# Patient Record
Sex: MALE | Race: WHITE | URBAN - METROPOLITAN AREA
[De-identification: names, ages, dates, MRNs, and addresses within clinical notes are randomized per-mention and may not be internally consistent; named-entity substitution may affect disease eponyms.]

---

## 2019-02-13 ENCOUNTER — APPOINTMENT (RX ONLY)
Dept: URBAN - METROPOLITAN AREA CLINIC 306 | Facility: CLINIC | Age: 79
Setting detail: DERMATOLOGY
End: 2019-02-13

## 2019-02-13 VITALS — WEIGHT: 200 LBS | HEIGHT: 69 IN

## 2019-02-13 DIAGNOSIS — L20.89 OTHER ATOPIC DERMATITIS: ICD-10-CM

## 2019-02-13 DIAGNOSIS — L57.0 ACTINIC KERATOSIS: ICD-10-CM

## 2019-02-13 DIAGNOSIS — Z85.820 PERSONAL HISTORY OF MALIGNANT MELANOMA OF SKIN: ICD-10-CM

## 2019-02-13 PROBLEM — L20.84 INTRINSIC (ALLERGIC) ECZEMA: Status: ACTIVE | Noted: 2019-02-13

## 2019-02-13 PROBLEM — D48.5 NEOPLASM OF UNCERTAIN BEHAVIOR OF SKIN: Status: ACTIVE | Noted: 2019-02-13

## 2019-02-13 PROBLEM — Z85.828 PERSONAL HISTORY OF OTHER MALIGNANT NEOPLASM OF SKIN: Status: ACTIVE | Noted: 2019-02-13

## 2019-02-13 PROBLEM — I10 ESSENTIAL (PRIMARY) HYPERTENSION: Status: ACTIVE | Noted: 2019-02-13

## 2019-02-13 PROBLEM — E13.9 OTHER SPECIFIED DIABETES MELLITUS WITHOUT COMPLICATIONS: Status: ACTIVE | Noted: 2019-02-13

## 2019-02-13 PROBLEM — M12.9 ARTHROPATHY, UNSPECIFIED: Status: ACTIVE | Noted: 2019-02-13

## 2019-02-13 PROBLEM — E78.5 HYPERLIPIDEMIA, UNSPECIFIED: Status: ACTIVE | Noted: 2019-02-13

## 2019-02-13 PROBLEM — H91.90 UNSPECIFIED HEARING LOSS, UNSPECIFIED EAR: Status: ACTIVE | Noted: 2019-02-13

## 2019-02-13 PROBLEM — J45.909 UNSPECIFIED ASTHMA, UNCOMPLICATED: Status: ACTIVE | Noted: 2019-02-13

## 2019-02-13 PROBLEM — L23.7 ALLERGIC CONTACT DERMATITIS DUE TO PLANTS, EXCEPT FOOD: Status: ACTIVE | Noted: 2019-02-13

## 2019-02-13 PROCEDURE — ? PRESCRIPTION

## 2019-02-13 PROCEDURE — 11102 TANGNTL BX SKIN SINGLE LES: CPT

## 2019-02-13 PROCEDURE — ? ADDITIONAL NOTES

## 2019-02-13 PROCEDURE — 99202 OFFICE O/P NEW SF 15 MIN: CPT | Mod: 25

## 2019-02-13 PROCEDURE — ? BIOPSY BY SHAVE METHOD

## 2019-02-13 PROCEDURE — ? COUNSELING

## 2019-02-13 RX ORDER — FLUOROURACIL 2 G/40G
CREAM TOPICAL
Qty: 1 | Refills: 0 | Status: ERX | COMMUNITY
Start: 2019-02-13

## 2019-02-13 RX ORDER — MUPIROCIN 20 MG/G
OINTMENT TOPICAL
Qty: 1 | Refills: 0 | Status: ERX | COMMUNITY
Start: 2019-02-13

## 2019-02-13 RX ADMIN — FLUOROURACIL: 2 CREAM TOPICAL at 00:00

## 2019-02-13 RX ADMIN — MUPIROCIN: 20 OINTMENT TOPICAL at 00:00

## 2019-02-13 ASSESSMENT — LOCATION DETAILED DESCRIPTION DERM
LOCATION DETAILED: RIGHT SUPERIOR LATERAL BUCCAL CHEEK
LOCATION DETAILED: RIGHT MEDIAL MALAR CHEEK
LOCATION DETAILED: LEFT LATERAL MALAR CHEEK
LOCATION DETAILED: LEFT CHIN
LOCATION DETAILED: LEFT SUPERIOR LATERAL BUCCAL CHEEK
LOCATION DETAILED: RIGHT INFERIOR CENTRAL MALAR CHEEK
LOCATION DETAILED: LEFT SUPERIOR FOREHEAD
LOCATION DETAILED: LEFT ANKLE
LOCATION DETAILED: RIGHT SUPERIOR FOREHEAD
LOCATION DETAILED: RIGHT CENTRAL MALAR CHEEK

## 2019-02-13 ASSESSMENT — LOCATION SIMPLE DESCRIPTION DERM
LOCATION SIMPLE: RIGHT FOREHEAD
LOCATION SIMPLE: RIGHT CHEEK
LOCATION SIMPLE: LEFT FOREHEAD
LOCATION SIMPLE: LEFT CHEEK
LOCATION SIMPLE: LEFT ANKLE
LOCATION SIMPLE: CHIN

## 2019-02-13 ASSESSMENT — LOCATION ZONE DERM
LOCATION ZONE: FACE
LOCATION ZONE: LEG

## 2019-02-13 NOTE — PROCEDURE: COUNSELING
Detail Level: Simple
Patient Specific Counseling (Will Not Stick From Patient To Patient): Continue using clobetasol bid - mix with mupirocin due to secondary infection. x 2 weeks.
Detail Level: Detailed

## 2019-02-13 NOTE — HPI: SKIN LESIONS
How Severe Is Your Skin Lesion?: moderate
Have Your Skin Lesions Been Treated?: not been treated
Is This A New Presentation, Or A Follow-Up?: Skin Lesion
Additional History: Multiple scaly lesions to face, no symptoms, present for years, no previous treatment. 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 silverio. No symptoms to area.

## 2019-02-13 NOTE — PROCEDURE: ADDITIONAL NOTES
Additional Notes: Patient will start Efudex in two months for actinic keratoses to face. Discussed the risks and the benefits.
Detail Level: Simple

## 2019-02-13 NOTE — PROCEDURE: BIOPSY BY SHAVE METHOD
Post-Care Instructions: I reviewed with the patient in detail post-care instructions. Patient is to keep the biopsy site dry overnight, and then apply vaseline or aquaphor twice daily until healed.

## 2019-02-21 ENCOUNTER — APPOINTMENT (RX ONLY)
Dept: URBAN - METROPOLITAN AREA CLINIC 306 | Facility: CLINIC | Age: 79
Setting detail: DERMATOLOGY
End: 2019-02-21

## 2019-02-21 PROBLEM — C44.41 BASAL CELL CARCINOMA OF SKIN OF SCALP AND NECK: Status: ACTIVE | Noted: 2019-02-21

## 2019-02-21 PROCEDURE — 17312 MOHS ADDL STAGE: CPT

## 2019-02-21 PROCEDURE — 13132 CMPLX RPR F/C/C/M/N/AX/G/H/F: CPT

## 2019-02-21 PROCEDURE — 17311 MOHS 1 STAGE H/N/HF/G: CPT

## 2019-02-21 PROCEDURE — ? MOHS SURGERY

## 2019-02-21 NOTE — HPI: PROCEDURE (MOHS)
Has The Growth Been Previously Biopsied?: has been previously biopsied
Body Location Override (Optional): Left superior posterior neck

## 2019-02-21 NOTE — PROCEDURE: MOHS SURGERY
Body Location Override (Optional - Billing Will Still Be Based On Selected Body Map Location If Applicable): left superior posterior neck

## 2019-03-07 ENCOUNTER — APPOINTMENT (RX ONLY)
Dept: URBAN - METROPOLITAN AREA CLINIC 306 | Facility: CLINIC | Age: 79
Setting detail: DERMATOLOGY
End: 2019-03-07

## 2019-03-07 DIAGNOSIS — Z48.02 ENCOUNTER FOR REMOVAL OF SUTURES: ICD-10-CM

## 2019-03-07 PROCEDURE — 99024 POSTOP FOLLOW-UP VISIT: CPT

## 2019-03-07 PROCEDURE — ? SUTURE REMOVAL (GLOBAL PERIOD)

## 2019-03-07 ASSESSMENT — LOCATION DETAILED DESCRIPTION DERM: LOCATION DETAILED: LEFT POSTERIOR NECK

## 2019-03-07 ASSESSMENT — LOCATION SIMPLE DESCRIPTION DERM: LOCATION SIMPLE: POSTERIOR NECK

## 2019-03-07 ASSESSMENT — LOCATION ZONE DERM: LOCATION ZONE: NECK

## 2019-03-07 NOTE — PROCEDURE: SUTURE REMOVAL (GLOBAL PERIOD)
Detail Level: Detailed
Add 64478 Cpt? (Important Note: In 2017 The Use Of 97723 Is Being Tracked By Cms To Determine Future Global Period Reimbursement For Global Periods): yes
Body Location Override (Optional - Billing Will Still Be Based On Selected Body Map Location If Applicable): left superior posterior neck

## 2019-04-11 ENCOUNTER — APPOINTMENT (RX ONLY)
Dept: URBAN - METROPOLITAN AREA CLINIC 306 | Facility: CLINIC | Age: 79
Setting detail: DERMATOLOGY
End: 2019-04-11

## 2019-04-11 DIAGNOSIS — Z48.817 ENCOUNTER FOR SURGICAL AFTERCARE FOLLOWING SURGERY ON THE SKIN AND SUBCUTANEOUS TISSUE: ICD-10-CM

## 2019-04-11 PROCEDURE — 99024 POSTOP FOLLOW-UP VISIT: CPT

## 2019-04-11 PROCEDURE — ? POST-OP WOUND CHECK

## 2019-04-11 ASSESSMENT — LOCATION DETAILED DESCRIPTION DERM: LOCATION DETAILED: LEFT SUPERIOR POSTERIOR NECK

## 2019-04-11 ASSESSMENT — LOCATION ZONE DERM: LOCATION ZONE: NECK

## 2019-04-11 ASSESSMENT — LOCATION SIMPLE DESCRIPTION DERM: LOCATION SIMPLE: POSTERIOR NECK

## 2019-04-11 NOTE — PROCEDURE: POST-OP WOUND CHECK
Detail Level: Detailed
Add 48443 Cpt? (Important Note: In 2017 The Use Of 55091 Is Being Tracked By Cms To Determine Future Global Period Reimbursement For Global Periods): yes

## 2022-10-12 NOTE — PROCEDURE: MOHS SURGERY
Please advise  Looks like this was dc'd 9/2021   Display The Frozen Section And Histology As A Separate Paragraph: Yes